# Patient Record
Sex: FEMALE | Race: WHITE
[De-identification: names, ages, dates, MRNs, and addresses within clinical notes are randomized per-mention and may not be internally consistent; named-entity substitution may affect disease eponyms.]

---

## 2020-07-07 ENCOUNTER — HOSPITAL ENCOUNTER (EMERGENCY)
Dept: HOSPITAL 97 - ER | Age: 20
Discharge: HOME | End: 2020-07-07
Payer: COMMERCIAL

## 2020-07-07 VITALS — DIASTOLIC BLOOD PRESSURE: 78 MMHG | TEMPERATURE: 98.5 F | SYSTOLIC BLOOD PRESSURE: 126 MMHG | OXYGEN SATURATION: 100 %
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
## 2020-07-07 DIAGNOSIS — S61.012A: Primary | ICD-10-CM
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
## 2020-07-07 PROCEDURE — 99283 EMERGENCY DEPT VISIT LOW MDM: CPT

## 2020-07-07 PROCEDURE — 0JQK0ZZ REPAIR LEFT HAND SUBCUTANEOUS TISSUE AND FASCIA, OPEN APPROACH: ICD-10-PCS

## 2020-07-07 NOTE — ER
Nurse's Notes                                                                                     

 [facility]                                                                 

Name: Humaira Busby                                                                               

Age: 20 yrs                                                                                       

Sex: Female                                                                                       

: 2000                                                                                   

MRN: [id number]                                                                                   

Arrival Date: 2020                                                                          

Time: 09:55                                                                                       

Account#: [id number]                                                                            

Bed 6                                                                                             

Private MD:                                                                                       

Diagnosis: left hand laceration                                                                   

                                                                                                  

Presentation:                                                                                     

                                                                                             

09:55 Chief complaint: EMS states: Left hand laceration after  blade fell onto hand    hb  

      while pouring smoothie. Bleeding controlled. Coronavirus screen: Proceed with normal        

      triage. Ebola Screen: No symptoms or risks identified at this time. Complicating            

      Factors: There are no complicating factors for this patient. Initial Sepsis Screen:         

      Does the patient meet any 2 criteria? No. Patient's initial sepsis screen is negative.      

      Does the patient have a suspected source of infection? No. Patient's initial sepsis         

      screen is negative. Risk Assessment: Do you want to hurt yourself or someone else?          

      Patient reports no desire to harm self or others. Onset of symptoms was 2020.      

09:55 Method Of Arrival: Ambulatory                                                             

09:55 Acuity: TROY 4                                                                           hb  

                                                                                                  

Triage Assessment:                                                                                

09:56 General: Appears in no apparent distress. Behavior is cooperative, anxious, crying.     hb  

      Pain: Pain currently is 8 out of 10 on a pain scale. EENT: No signs and/or symptoms         

      were reported regarding the EENT system. Neuro: Level of Consciousness is awake, alert,     

      obeys commands, Oriented to person, place, time, situation. Cardiovascular: Patient's       

      skin is warm and dry. Respiratory: Respiratory effort is even, unlabored, Respiratory       

      pattern is regular, symmetrical. GI: No signs and/or symptoms were reported involving       

      the gastrointestinal system. : No signs and/or symptoms were reported regarding the       

      genitourinary system. Derm: Skin is pink, warm \T\ dry. Musculoskeletal: No signs and/or    

      symptoms reported regarding the musculoskeletal system. Injury Description: Laceration      

      sustained to palmar aspect of proximal phalanx of left thumb is clean, 2.6 to 7.5 cm        

      long, not bleeding, was sustained 30-60 minutes ago.                                        

                                                                                                  

OB/GYN:                                                                                           

09:56 LMP N/A - Birth control method                                                          hb  

                                                                                                  

Historical:                                                                                       

- Allergies:                                                                                      

09:57 No Known Allergies;                                                                     hb  

                                                                                                  

- Immunization history:: Adult Immunizations up to date.                                          

- Social history:: Smoking status: Patient denies any tobacco usage or history of.                

                                                                                                  

                                                                                                  

Screening:                                                                                        

10:02 Abuse screen: Denies threats or abuse. Denies injuries from another. Nutritional        hb  

      screening: No deficits noted. Tuberculosis screening: No symptoms or risk factors           

      identified. Fall Risk None identified.                                                      

                                                                                                  

Assessment:                                                                                       

09:58 General: see triage.                                                                    hb  

10:02 Reassessment: Dr. Nair at bedside for laceration repair.                              hb  

10:40 Reassessment: PT D/C HOME AMBULATORY, DX WITH SIMPLE LACERATION WITHOUT FOREIGN BODY.   bp  

                                                                                                  

Vital Signs:                                                                                      

09:55  / 88; Pulse 16; Resp 94; Temp 98.3; Pulse Ox 100% ; Weight 54.43 kg; Height 5    hb  

      ft. 4 in. (162.56 cm); Pain 8/10;                                                           

10:40  / 78; Pulse 91; Resp 17; Temp 98.5; Pulse Ox 100% ;                              bp  

09:55 Body Mass Index 20.60 (54.43 kg, 162.56 cm)                                             hb  

                                                                                                  

ED Course:                                                                                        

09:55 Patient arrived in ED.                                                                  hb  

09:56 Darian Nair MD is Attending Physician.                                             ps1 

09:57 Triage completed.                                                                       hb  

09:57 Arm band placed on.                                                                     hb  

10:02 Patient has correct armband on for positive identification. Bed in low position. Call     

      light in reach.                                                                             

10:08 Shira Graves RN is Primary Nurse.                                                   hb  

10:15 Assist provider with laceration repair on palmar aspect of proximal phalanx of left     bp  

      thumb that was 2.5 cm. or less using sutures. Set up tray. Performed by Darian Nair MD Dressed with 4X4s, Patient tolerated well.                                               

10:40 Patient did not have IV access during this emergency room visit.                        bp  

                                                                                                  

Administered Medications:                                                                         

No medications were administered                                                                  

                                                                                                  

                                                                                                  

Outcome:                                                                                          

10:31 Discharge ordered by MD.                                                                ps1 

10:40 Discharged to home ambulatory.                                                          bp  

10:40 Condition: stable                                                                           

10:40 Discharge instructions given to patient, Instructed on discharge instructions, follow       

      up and referral plans. wound care, Demonstrated understanding of instructions,              

      follow-up care, wound care.                                                                 

10:43 Patient left the ED.                                                                    bp  

                                                                                                  

Signatures:                                                                                       

Shira Graves, RN                     RN   Tony Tovar RN RN bp Singer, Phillip, MD MD   ps1                                                  

                                                                                                  

**************************************************************************************************

## 2020-07-07 NOTE — EDPHYS
Physician Documentation                                                                           

 [facility]                                                                 

Name: Humaira Busby                                                                               

Age: 20 yrs                                                                                       

Sex: Female                                                                                       

: 2000                                                                                   

MRN: [id number]                                                                                   

Arrival Date: 2020                                                                          

Time: 09:55                                                                                       

Account#: [id number]                                                                            

Bed 6                                                                                             

Private MD:                                                                                       

ED Physician Darian Nair                                                                      

HPI:                                                                                              

                                                                                             

10:25 This 20 yrs old  Female presents to ER via Ambulatory with complaints of       ps1 

      Laceration To Hand.                                                                         

10:25 The patient has a laceration related to: cooking, from a sharp metal object, occurred   ps1 

      at home, and there are no complicating factors. The injury was accidental. The              

      laceration(s) is(are) located on the left hand and palmar aspect of proximal phalanx of     

      left thumb. Onset: The symptoms/episode began/occurred just prior to arrival.               

      Associated signs and symptoms: The patient has no apparent associated signs or              

      symptoms, Pertinent negatives: heavy bleeding, numbness distal to injury. Tetanus UTD. .    

                                                                                                  

OB/GYN:                                                                                           

09:56 LMP N/A - Birth control method                                                          hb  

                                                                                                  

Historical:                                                                                       

- Allergies:                                                                                      

09:57 No Known Allergies;                                                                     hb  

                                                                                                  

- Immunization history:: Adult Immunizations up to date.                                          

- Social history:: Smoking status: Patient denies any tobacco usage or history of.                

                                                                                                  

                                                                                                  

ROS:                                                                                              

10:25 Constitutional: Negative for fever, chills, and weight loss, Eyes: Negative for injury, ps1 

      pain, redness, and discharge.                                                               

10:25 MS/extremity: Positive for injury or acute deformity, laceration.                           

10:25 Skin: Negative for cellulitis, FB presence.                                                 

10:25 Neuro: Negative for numbness, weakness.                                                     

                                                                                                  

Exam:                                                                                             

10:25 Constitutional:  This is a well developed, well nourished patient who is awake, alert,  ps1 

      and in no acute distress. Head/Face:  Normocephalic, atraumatic. Eyes:  Pupils equal        

      round and reactive to light, extra-ocular motions intact.  Lids and lashes normal.          

      Conjunctiva and sclera are non-icteric and not injected.                                    

10:25 Cardiovascular: Rate: normal.                                                               

10:25 Respiratory: the patient does not display signs of respiratory distress,  Respirations:     

      normal.                                                                                     

10:25 Musculoskeletal/extremity: Extremities: grossly normal except: noted in the palmar          

      aspect of proximal phalanx of left thumb: laceration.                                       

10:25 Neuro: Motor: is normal, distal to wound. , Sensation: is normal.                           

                                                                                                  

Vital Signs:                                                                                      

09:55  / 88; Pulse 16; Resp 94; Temp 98.3; Pulse Ox 100% ; Weight 54.43 kg; Height 5    hb  

      ft. 4 in. (162.56 cm); Pain 8/10;                                                           

10:40  / 78; Pulse 91; Resp 17; Temp 98.5; Pulse Ox 100% ;                              bp  

09:55 Body Mass Index 20.60 (54.43 kg, 162.56 cm)                                             hb  

                                                                                                  

Laceration:                                                                                       

10:25 Wound Repair of 3cm ( 1.2in ) subcutaneous laceration to palmar aspect of proximal      ps1 

      phalanx of left thumb. Distal neuro/vascular/tendon intact. Anesthesia: Digital block       

      administered with 4 mls of 1% lidocaine. Wound prep: Moderate cleansing, Wound explored     

      minimally. Skin closed with 4 4-0 Prolene using simple sutures and sterile technique.       

      Dressed with 4x4's, Latanya, finger splint for protection. Patient tolerated well.            

                                                                                                  

MDM:                                                                                              

10:25 Differential diagnosis: superficial laceration, tendon injury, vascular injury. Data    ps1 

      reviewed: vital signs, nurses notes. Counseling: I had a detailed discussion with the       

      patient and/or guardian regarding: the historical points, exam findings, and any            

      diagnostic results supporting the discharge/admit diagnosis, to return to the emergency     

      department if symptoms worsen or persist or if there are any questions or concerns that     

      arise at home, especially signs of infection, loss of movement, popping or worsening of     

      condition, or if other symptoms arise as documented in the discharge instructions. .        

10:31 Patient medically screened.                                                             ps1 

                                                                                                  

Administered Medications:                                                                         

No medications were administered                                                                  

                                                                                                  

                                                                                                  

Disposition:                                                                                      

20 10:31 Discharged to Home. Impression: left hand laceration.                              

- Condition is Stable.                                                                            

- Discharge Instructions: Laceration Care, Adult, Easy-to-Read.                                   

                                                                                                  

- Medication Reconciliation Form, Thank You Letter, Antibiotic Education, Prescription            

  Opioid Use form.                                                                                

- Follow up: Emergency Department; When: 10 - 14 days; Reason: Fever > 102 F, Worsening           

  of condition, Staple/Suture removal. Follow up: Private Physician; When: As needed;             

  Reason: Further diagnostic work-up, Recheck today's complaints, Staple/Suture                   

  removal, Re-evaluation by your physician.                                                       

- Problem is new.                                                                                 

- Symptoms have improved.                                                                         

                                                                                                  

                                                                                                  

                                                                                                  

Signatures:                                                                                       

Graves, Shira, RN                     RN   hb                                                   

Tony José, RN                      RN   bp                                                   

Darian Nair MD MD   ps1                                                  

                                                                                                  

Corrections: (The following items were deleted from the chart)                                    

10:43 10:31 2020 10:31 Discharged to Home. Impression: left hand laceration. Condition  bp  

      is Stable. Forms are Medication Reconciliation Form, Thank You Letter, Antibiotic           

      Education, Prescription Opioid Use. Follow up: Emergency Department; When: 10 - 14          

      days; Reason: Fever > 102 F, Worsening of condition, Staple/Suture removal. Follow up:      

      Private Physician; When: As needed; Reason: Further diagnostic work-up, Recheck today's     

      complaints, Staple/Suture removal, Re-evaluation by your physician. Problem is new.         

      Symptoms have improved. ps1                                                                 

                                                                                                  

**************************************************************************************************

## 2020-07-21 ENCOUNTER — HOSPITAL ENCOUNTER (EMERGENCY)
Dept: HOSPITAL 97 - ER | Age: 20
Discharge: HOME | End: 2020-07-21
Payer: COMMERCIAL

## 2020-07-21 DIAGNOSIS — Z48.02: Primary | ICD-10-CM

## 2020-07-21 PROCEDURE — 99281 EMR DPT VST MAYX REQ PHY/QHP: CPT

## 2020-07-21 NOTE — ER
Nurse's Notes                                                                                     

 [facility]                                                                 

Name: Humaira Busby                                                                               

Age: 20 yrs                                                                                       

Sex: Female                                                                                       

: 2000                                                                                   

MRN: [id number]                                                                                   

Arrival Date: 2020                                                                          

Time: 16:42                                                                                       

Account#: [id number]                                                                            

Bed 24                                                                                            

Private MD:                                                                                       

Diagnosis: Encounter for removal of sutures                                                       

                                                                                                  

Presentation:                                                                                     

                                                                                             

16:47 Chief complaint: Patient states: Suture removal on Lac repair on L thumb done 2 weeks   ca1 

      ago. Wound appears, dry intact and healing well. Coronavirus screen: Patient denies a       

      cough. Patient denies shortness of breath or difficulty breathing. Patient denies           

      measured and/or subjective temperature greater than 100.4F prior to today's visit.          

      Patient denies travel on a cruise ship or to a country the [facility] currently lists as an        

      affected area. Patient denies contact with known and/or suspected case of COVID-19.         

      Proceed with normal triage. Ebola Screen: Patient negative for fever greater than or        

      equal to 101.5 degrees Fahrenheit, and additional compatible Ebola Virus Disease            

      symptoms Patient denies exposure to infectious person. Patient denies travel to an          

      Ebola-affected area in the 21 days before illness onset. No symptoms or risks               

      identified at this time. Initial Sepsis Screen: Does the patient meet any 2 criteria?       

      No. Patient's initial sepsis screen is negative. Does the patient have a suspected          

      source of infection? No. Patient's initial sepsis screen is negative. Risk Assessment:      

      Do you want to hurt yourself or someone else? Patient reports no desire to harm self or     

      others. Onset of symptoms was 2020.                                                

16:47 Method Of Arrival: Ambulatory                                                           ca1 

16:47 Acuity: TROY 5                                                                           ca1 

                                                                                                  

Triage Assessment:                                                                                

16:50 General: Appears in no apparent distress. comfortable, Behavior is calm, cooperative,   ca1 

      appropriate for age. Pain: Denies pain. Neuro: Level of Consciousness is awake, alert,      

      obeys commands, Oriented to person, place, time, situation. Derm: Skin is intact, is        

      healthy with good turgor, Skin is pink, warm \T\ dry. Musculoskeletal: Circulation,         

      motion, and sensation intact. Capillary refill < 3 seconds.                                 

                                                                                                  

OB/GYN:                                                                                           

16:49 LMP 2020                                                                            ca1 

                                                                                                  

Historical:                                                                                       

- Allergies:                                                                                      

16:49 No Known Allergies;                                                                     ca1 

- Home Meds:                                                                                      

16:49 None [Active];                                                                          ca1 

- PMHx:                                                                                           

16:49 None;                                                                                   ca1 

- PSHx:                                                                                           

16:49 None;                                                                                   ca1 

                                                                                                  

- Immunization history:: Adult Immunizations up to date.                                          

- Social history:: Smoking status: Patient denies any tobacco usage or history of.                

                                                                                                  

                                                                                                  

Screenin:51 Abuse screen: Denies threats or abuse. Denies injuries from another. Nutritional        ca1 

      screening: No deficits noted. Tuberculosis screening: No symptoms or risk factors           

      identified. Fall Risk None identified.                                                      

                                                                                                  

Assessment:                                                                                       

16:51 Reassessment: See triage notes.                                                         ca1 

                                                                                                  

Vital Signs:                                                                                      

16:47  / 74; Pulse 86; Resp 15 S; Temp 98(TE); Pulse Ox 100% on R/A; Weight 54.43 kg    ca1 

      (R); Height 5 ft. 4 in. (162.56 cm) (R);                                                    

16:47 Body Mass Index 20.60 (54.43 kg, 162.56 cm)                                             ca1 

                                                                                                  

ED Course:                                                                                        

16:42 Patient arrived in ED.                                                                  fj1 

16:44 Vickie Winston FNP-C is [facility].                                                        kb  

16:44 Adiel Taylor MD is Attending Physician.                                                kb  

16:49 Triage completed.                                                                       ca1 

16:49 Arm band placed on right wrist.                                                         ca1 

16:50 Removal of Removed sutures from palmar aspect of proximal phalanx of left thumb Suture  ca1 

      site is well healed Patient tolerated well.                                                 

16:51 Acob, Scarlett, RN is Primary Nurse.                                                      ca1 

16:51 Patient has correct armband on for positive identification.                             ca1 

16:51 No provider procedures requiring assistance completed. Patient did not have IV access   ca1 

      during this emergency room visit.                                                           

                                                                                                  

Administered Medications:                                                                         

No medications were administered                                                                  

                                                                                                  

                                                                                                  

Outcome:                                                                                          

16:51 Discharge ordered by MD.                                                                kb  

16:52 Discharged to home ambulatory.                                                          ca1 

16:52 Condition: stable                                                                           

16:52 Discharge instructions given to patient, Instructed on discharge instructions, follow       

      up and referral plans. Demonstrated understanding of instructions, follow-up care.          

16:52 Patient left the ED.                                                                    ca1 

                                                                                                  

Signatures:                                                                                       

Vickie Winston FNP-C FNP-Ckb Acob, Cheryl, RN                        RN   ca1                                                  

Cornell Soria                                 fj1                                                  

                                                                                                  

**************************************************************************************************

## 2020-07-21 NOTE — XMS REPORT
Continuity of Care Document

                            Created on:2020



Patient:ROGER CABRERA

Sex:Female

:2000

External Reference #:[id number]





Demographics







                          Address                   [address]



                                                    [place] [address]

 

                          Home Phone                [phone]

 

                          Work Phone                [phone]

 

                          Mobile Phone              [phone]

 

                          Email Address             [email or web address]

 

                          Preferred Language        English

 

                          Marital Status            Unknown

 

                          Gnosticism Affiliation     Unknown

 

                          Race                      Unknown

 

                          Additional Race(s)        Unavailable



                                                    White

 

                          Ethnic Group              Not  or 









Author







                          Organization              [facility]

t

 

                          Address                   [address]



                                                    [place] [address]

 

                          Phone                     [phone]









Support







                Name            Relationship    Address         Phone

 

                Qi          Mother          Unavailable     [phone]









Care Team Providers







                    Name                Role                Phone

 

                    Mally DANIEL NGUYEN   Attending Clinician [phone]

 

                    Adalberto COLLIER             Attending Clinician [phone]









Problems

This patient has no known problems.



Allergies, Adverse Reactions, Alerts

This patient has no known allergies or adverse reactions.



Medications

This patient has no known medications.



Procedures

This patient has no known procedures.



Encounters







        Start   End     Encounter Admission Attending Care    Care    Encounter 

Source



        Date/Time Date/Time Type    Type    Clinicians Facility Department ID   

   

 

        2020 Telephone         Mally, DEANA [id number]

 [id number] 



        00:00:00 00:00:00                 [facility] [id number]         



                                                [facility] [id number]         



                                                        [phone]         



                                                        113             

 

        2020 Telephone         [place], DEANA [id number]

 [id number] 



        00:00:00 00:00:00                 [facility] [id number]         



                                                [facility] [id number]         



                                                        [phone]         



                                                        095             

 

        2020 Office          [facility]    [id number]

[id number] 



        15:09:33 11:40:15 Visit                   PRIMARY [id number]         



                                                CARE    [id number]         



                                                [place] [phone]         



                                                        044             







Results

This patient has no known results.

## 2020-07-21 NOTE — EDPHYS
Physician Documentation                                                                           

 [facility]                                                                 

Name: Humaira Busby                                                                               

Age: 20 yrs                                                                                       

Sex: Female                                                                                       

: 2000                                                                                   

MRN: [id number]                                                                                   

Arrival Date: 2020                                                                          

Time: 16:42                                                                                       

Account#: [id number]                                                                            

Bed 24                                                                                            

Private MD:                                                                                       

ED Physician Adiel Taylor                                                                         

HPI:                                                                                              

                                                                                             

16:56 This 20 yrs old  Female presents to ER via Ambulatory with complaints of       kb  

      Suture Removal.                                                                             

16:56 The patient has sutures on the palmar aspect of proximal phalanx of left thumb.         kb  

      Previous treatment: The patient was initially treated 14 day(s) ago, the care was           

      rendered at [facility]. Sutures/staples progress: The patient        

      has no c/o's. The wound is well-healing with no redness, swelling, discharge, or            

      dehiscence reported. The patient has not experienced similar symptoms in the past. The      

      patient has not recently seen a physician.                                                  

                                                                                                  

OB/GYN:                                                                                           

16:49 LMP 2020                                                                            ca1 

                                                                                                  

Historical:                                                                                       

- Allergies:                                                                                      

16:49 No Known Allergies;                                                                     ca1 

- Home Meds:                                                                                      

16:49 None [Active];                                                                          ca1 

- PMHx:                                                                                           

16:49 None;                                                                                   ca1 

- PSHx:                                                                                           

16:49 None;                                                                                   ca1 

                                                                                                  

- Immunization history:: Adult Immunizations up to date.                                          

- Social history:: Smoking status: Patient denies any tobacco usage or history of.                

                                                                                                  

                                                                                                  

ROS:                                                                                              

16:56 Constitutional: Negative for fever, chills, and weight loss, Cardiovascular: Negative   kb  

      for chest pain, palpitations, and edema, Respiratory: Negative for shortness of breath,     

      cough, wheezing, and pleuritic chest pain, Abdomen/GI: Negative for abdominal pain,         

      nausea, vomiting, diarrhea, and constipation, MS/Extremity: Negative for injury and         

      deformity, Neuro: Negative for headache, weakness, numbness, tingling, and seizure.         

16:56 Skin: Positive for of the palmar aspect of proximal phalanx of left thumb, sutures in       

      place.                                                                                      

                                                                                                  

Exam:                                                                                             

16:56 Constitutional:  This is a well developed, well nourished patient who is awake, alert,  kb  

      and in no acute distress. Head/Face:  Normocephalic, atraumatic. Chest/axilla:  Normal      

      chest wall appearance and motion.  Nontender with no deformity.  No lesions are             

      appreciated. Cardiovascular:  Regular rate and rhythm with a normal S1 and S2.  No          

      gallops, murmurs, or rubs.  Normal PMI, no JVD.  No pulse deficits. Respiratory:  Lungs     

      have equal breath sounds bilaterally, clear to auscultation and percussion.  No rales,      

      rhonchi or wheezes noted.  No increased work of breathing, no retractions or nasal          

      flaring. Abdomen/GI:  Soft, non-tender, with normal bowel sounds.  No distension or         

      tympany.  No guarding or rebound.  No evidence of tenderness throughout. MS/ Extremity:     

       Pulses equal, no cyanosis.  Neurovascular intact.  Full, normal range of motion.           

      Neuro:  Awake and alert, GCS 15, oriented to person, place, time, and situation.            

      Cranial nerves II-XII grossly intact.  Motor strength 5/5 in all extremities.  Sensory      

      grossly intact.  Cerebellar exam normal.  Normal gait.                                      

16:56 Skin: Wound recheck: Suture laceration closure: the wound is healing well, the edges        

      are well approximated, no evidence of dehiscence, no drainage, no erythema, no swelling.    

                                                                                                  

Vital Signs:                                                                                      

16:47  / 74; Pulse 86; Resp 15 S; Temp 98(TE); Pulse Ox 100% on R/A; Weight 54.43 kg    ca1 

      (R); Height 5 ft. 4 in. (162.56 cm) (R);                                                    

16:47 Body Mass Index 20.60 (54.43 kg, 162.56 cm)                                             ca1 

                                                                                                  

Procedures:                                                                                       

16:56 Suture/Staple removal: Removed 4 sutures, from palmar aspect of proximal phalanx of     kb  

      left thumb, site appears well healed, Patient tolerated well.                               

                                                                                                  

MDM:                                                                                              

16:50 Patient medically screened.                                                             kb  

16:57 Data reviewed: vital signs, nurses notes. Data interpreted: Pulse oximetry: on room air kb  

      is 100 %. Interpretation: normal. Counseling: I had a detailed discussion with the          

      patient and/or guardian regarding: the historical points, exam findings, and any            

      diagnostic results supporting the discharge/admit diagnosis, the need for outpatient        

      follow up, a family practitioner, to return to the emergency department if symptoms         

      worsen or persist or if there are any questions or concerns that arise at home.             

                                                                                                  

Administered Medications:                                                                         

No medications were administered                                                                  

                                                                                                  

                                                                                                  

Disposition:                                                                                      

18:17 Co-signature as Attending Physician, Adiel Taylor MD.                                    rn  

                                                                                                  

Disposition:                                                                                      

20 16:51 Discharged to Home. Impression: Encounter for removal of sutures.                  

- Condition is Stable.                                                                            

- Discharge Instructions: Suture Removal, Care After.                                             

                                                                                                  

- Medication Reconciliation Form, Thank You Letter, Antibiotic Education, Prescription            

  Opioid Use form.                                                                                

- Follow up: Emergency Department; When: As needed; Reason: Worsening of condition.               

  Follow up: Private Physician; When: 2 - 3 days; Reason: Recheck today's complaints,             

  Continuance of care, Re-evaluation by your physician.                                           

                                                                                                  

                                                                                                  

                                                                                                  

Signatures:                                                                                       

Vickie Winston, CLARITAP-C                 FNP-Ckb                                                   

Adiel Taylor MD MD   rn                                                   

Acob, AUDREY Pantoja                        RN   ca1                                                  

                                                                                                  

Corrections: (The following items were deleted from the chart)                                    

16:52 16:51 2020 16:51 Discharged to Home. Impression: Encounter for removal of         ca1 

      sutures. Condition is Stable. Forms are Medication Reconciliation Form, Thank You           

      Letter, Antibiotic Education, Prescription Opioid Use. Follow up: Emergency Department;     

      When: As needed; Reason: Worsening of condition. Follow up: Private Physician; When: 2      

      - 3 days; Reason: Recheck today's complaints, Continuance of care, Re-evaluation by         

      your physician. kb                                                                          

                                                                                                  

**************************************************************************************************

## 2020-07-22 VITALS — SYSTOLIC BLOOD PRESSURE: 113 MMHG | OXYGEN SATURATION: 100 % | TEMPERATURE: 98 F | DIASTOLIC BLOOD PRESSURE: 74 MMHG
